# Patient Record
Sex: FEMALE | Race: WHITE | NOT HISPANIC OR LATINO | Employment: FULL TIME | URBAN - METROPOLITAN AREA
[De-identification: names, ages, dates, MRNs, and addresses within clinical notes are randomized per-mention and may not be internally consistent; named-entity substitution may affect disease eponyms.]

---

## 2018-04-12 ENCOUNTER — OFFICE VISIT (OUTPATIENT)
Dept: OBSTETRICS AND GYNECOLOGY | Facility: CLINIC | Age: 28
End: 2018-04-12

## 2018-04-12 VITALS
SYSTOLIC BLOOD PRESSURE: 136 MMHG | BODY MASS INDEX: 25.99 KG/M2 | HEIGHT: 65 IN | DIASTOLIC BLOOD PRESSURE: 87 MMHG | HEART RATE: 110 BPM | WEIGHT: 156 LBS

## 2018-04-12 DIAGNOSIS — Z71.6 ENCOUNTER FOR TOBACCO USE CESSATION COUNSELING: ICD-10-CM

## 2018-04-12 DIAGNOSIS — Z11.3 SCREEN FOR STD (SEXUALLY TRANSMITTED DISEASE): Primary | ICD-10-CM

## 2018-04-12 DIAGNOSIS — R87.612 LOW GRADE SQUAMOUS INTRAEPITH LESION ON CYTOLOGIC SMEAR CERVIX (LGSIL): ICD-10-CM

## 2018-04-12 DIAGNOSIS — Z30.09 BIRTH CONTROL COUNSELING: ICD-10-CM

## 2018-04-12 DIAGNOSIS — Z11.4 SCREENING FOR HIV (HUMAN IMMUNODEFICIENCY VIRUS): ICD-10-CM

## 2018-04-12 DIAGNOSIS — Z12.4 SCREENING FOR CERVICAL CANCER: ICD-10-CM

## 2018-04-12 PROCEDURE — 99406 BEHAV CHNG SMOKING 3-10 MIN: CPT | Performed by: OBSTETRICS & GYNECOLOGY

## 2018-04-12 PROCEDURE — 99214 OFFICE O/P EST MOD 30 MIN: CPT | Performed by: OBSTETRICS & GYNECOLOGY

## 2018-04-12 NOTE — PROGRESS NOTES
"Subjective   Baron Herman is a 27 y.o. female.   CC: Pt here for std check.  History of Present Illness   Pt here for std check.  Patient was diagnosed with chlamydia in October 2016.  She has not been seen since then.  She reports that she thought that she was supposed to have a week of therapy; however, she only received 1 day of therapy.  I explained to the patient this is the standard care for chlamydia.  She reports that her partner was also evaluated; however, they had never had sex again after she was diagnosed with chlamydia.  The patient denies any abnormal symptoms today.  She denies discharge, pain, burning.  She denies irregularities to her periods.  She is sexually active.  She is interested in birth control.  She thinks she would like the Mirena.  We have discussed this in the past, and she states that she is ready to move forward with the Mirena.    Social History   Substance Use Topics   • Smoking status: Current Every Day Smoker     Packs/day: 0.50   • Smokeless tobacco: Never Used      Comment: Risks of smoking were discussed.  Full cessation is encouraged.   • Alcohol use No       Review of Systems  General: No fever or chills  Abdomen: No nausea, vomiting, constipation or diarrhea  : No dysuria, no hematuria  Neuro: No parathesia, no weakness  Psych: Normal though content, no hallucinations, no SI/HI    Objective   Physical Exam  Vitals:    04/12/18 0817   BP: 136/87   Pulse: 110   Weight: 70.8 kg (156 lb)   Height: 165.1 cm (65\")   Gen: No acute distress, awake and oriented times three  Abdomen: soft, nontender, non distended, normoactive bowel sounds  Pelvic: Exam performed in the presence of a female chaperone  Patient has provided verbal consent to proceed with exam.  Normal external female genitalia, no lesions  Vagina: No blood or discharge  Cervix: No cervical motion tenderness, no lesions, no active bleeding, nonfriable  Uterus: Anteverted, normal size and shape, nontender  Adnexa: " No masses or tenderness  Rectal: Deferred  Psych: Good judgement and insight, normal affect and mood      Assessment/Plan   Diagnoses and all orders for this visit:    Screen for STD (sexually transmitted disease)  -     Chlamydia trachomatis, Neisseria gonorrhoeae, Trichomonas vaginalis, PCR - Swab, Vagina  -     HIV-1 / O / 2 Ag / Antibody 4th Generation  -     Hepatitis C Antibody  -     Hepatitis B Surface Antigen  -     RPR    Low grade squamous intraepith lesion on cytologic smear cervix (lgsil)  -     IGP, Apt HPV,rfx 16 / 18,45 - ThinPrep Vial, Cervix    Screening for cervical cancer  -     IGP, Apt HPV,rfx 16 / 18,45 - ThinPrep Vial, Cervix    Screening for HIV (human immunodeficiency virus)  -     HIV-1 / O / 2 Ag / Antibody 4th Generation    Birth control counseling    STD testing performed. She is instructed to call our office for the results if she has not heard them after 1 week.   Additionally, the patient had an abnormal Pap smear at the time of her last visit.  She was previously counseled about these Pap smear findings and the need for repeat Pap smear in one year; however, she was lost to follow-up.  She again had questions about her abnormal Pap smear findings.  These findings were discussed at length again today.  I explained recommendations to repeat Pap smear at this time.  I expanded for Pap smear is abnormal again she will need to undergo colposcopic evaluation.  Vision is a smoker.  We discussed the importance of tobacco cessation in general, and especially as it applies to cervical cancer pathology.  Greater than 3 minutes was spent on tobacco cessation counseling.  We discussed birth control options.  The patient is interested in Mirena.  Educational handouts were provided to the patient.  We will attempt order the device so that it may be inserted at the time of her next menses.  Preoperative instructions for Mirena insertion were given to the patient.    I spent 20 out of 25 minutes  with the patient in face to face counseling of the above issues.

## 2018-04-13 ENCOUNTER — TELEPHONE (OUTPATIENT)
Dept: OBSTETRICS AND GYNECOLOGY | Facility: CLINIC | Age: 28
End: 2018-04-13

## 2018-04-13 LAB
HBV SURFACE AG SERPL QL IA: NEGATIVE
HCV AB S/CO SERPL IA: <0.1 S/CO RATIO (ref 0–0.9)
HIV 1+2 AB+HIV1 P24 AG SERPL QL IA: NON REACTIVE
RPR SER QL: NORMAL

## 2018-04-13 NOTE — TELEPHONE ENCOUNTER
----- Message from Amrik Berumen MD sent at 4/13/2018 12:38 PM EDT -----  Notify the patient of the STD blood work was normal.  I'm still waiting for the culture and Pap results

## 2018-04-14 LAB
C TRACH RRNA SPEC QL NAA+PROBE: NEGATIVE
N GONORRHOEA RRNA SPEC QL NAA+PROBE: NEGATIVE
T VAGINALIS RRNA SPEC QL NAA+PROBE: NEGATIVE

## 2018-04-16 LAB
CYTOLOGIST CVX/VAG CYTO: NORMAL
CYTOLOGY CVX/VAG DOC THIN PREP: NORMAL
DX ICD CODE: NORMAL
DX ICD CODE: NORMAL
HIV 1 & 2 AB SER-IMP: NORMAL
HPV I/H RISK 4 DNA CVX QL PROBE+SIG AMP: NEGATIVE
OTHER STN SPEC: NORMAL
PATH REPORT.FINAL DX SPEC: NORMAL
PATHOLOGIST CVX/VAG CYTO: NORMAL
STAT OF ADQ CVX/VAG CYTO-IMP: NORMAL

## 2018-04-18 ENCOUNTER — TELEPHONE (OUTPATIENT)
Dept: OBSTETRICS AND GYNECOLOGY | Facility: CLINIC | Age: 28
End: 2018-04-18

## 2018-04-18 NOTE — TELEPHONE ENCOUNTER
----- Message from Amrik Berumen MD sent at 4/17/2018  2:54 PM EDT -----  Notify the patient that her Pap smear was normal and her gonorrhea and chlamydia tests were negative

## 2018-04-23 ENCOUNTER — TELEPHONE (OUTPATIENT)
Dept: OBSTETRICS AND GYNECOLOGY | Facility: CLINIC | Age: 28
End: 2018-04-23

## 2018-04-23 NOTE — TELEPHONE ENCOUNTER
Left message to call office.  Pt's Lay is at office need to schedule an appointment for insertion. NIKKI

## 2018-07-16 ENCOUNTER — TELEPHONE (OUTPATIENT)
Dept: OBSTETRICS AND GYNECOLOGY | Facility: CLINIC | Age: 28
End: 2018-07-16

## 2018-07-24 ENCOUNTER — INITIAL PRENATAL (OUTPATIENT)
Dept: OBSTETRICS AND GYNECOLOGY | Facility: CLINIC | Age: 28
End: 2018-07-24

## 2018-07-24 VITALS — WEIGHT: 156 LBS | SYSTOLIC BLOOD PRESSURE: 136 MMHG | DIASTOLIC BLOOD PRESSURE: 81 MMHG | BODY MASS INDEX: 25.96 KG/M2

## 2018-07-24 DIAGNOSIS — Z02.83 ENCOUNTER FOR DRUG SCREENING: ICD-10-CM

## 2018-07-24 DIAGNOSIS — Z34.01 ENCOUNTER FOR SUPERVISION OF NORMAL FIRST PREGNANCY IN FIRST TRIMESTER: Primary | ICD-10-CM

## 2018-07-24 DIAGNOSIS — N96 HISTORY OF RECURRENT MISCARRIAGES: ICD-10-CM

## 2018-07-24 DIAGNOSIS — Z71.6 ENCOUNTER FOR TOBACCO USE CESSATION COUNSELING: ICD-10-CM

## 2018-07-24 DIAGNOSIS — Z11.4 SCREENING FOR HIV (HUMAN IMMUNODEFICIENCY VIRUS): ICD-10-CM

## 2018-07-24 DIAGNOSIS — Z11.3 SCREEN FOR STD (SEXUALLY TRANSMITTED DISEASE): ICD-10-CM

## 2018-07-24 DIAGNOSIS — Z13.228 CYSTIC FIBROSIS SCREENING: ICD-10-CM

## 2018-07-24 DIAGNOSIS — O36.80X0 PREGNANCY WITH UNCERTAIN FETAL VIABILITY, SINGLE OR UNSPECIFIED FETUS: ICD-10-CM

## 2018-07-24 PROBLEM — L20.81 ATOPIC NEURODERMATITIS: Status: ACTIVE | Noted: 2018-07-24

## 2018-07-24 PROBLEM — Z30.09 BIRTH CONTROL COUNSELING: Status: RESOLVED | Noted: 2018-04-12 | Resolved: 2018-07-24

## 2018-07-24 PROCEDURE — 99214 OFFICE O/P EST MOD 30 MIN: CPT | Performed by: OBSTETRICS & GYNECOLOGY

## 2018-07-24 PROCEDURE — 99406 BEHAV CHNG SMOKING 3-10 MIN: CPT | Performed by: OBSTETRICS & GYNECOLOGY

## 2018-07-24 RX ORDER — PRENATAL VIT/IRON FUM/FOLIC AC 27MG-0.8MG
TABLET ORAL DAILY
COMMUNITY
End: 2018-12-20

## 2018-07-24 RX ORDER — PHENOL 1.4 %
AEROSOL, SPRAY (ML) MUCOUS MEMBRANE
COMMUNITY
End: 2018-07-24

## 2018-07-24 RX ORDER — DIPHENHYDRAMINE HCL 50 MG
50 CAPSULE ORAL EVERY 6 HOURS PRN
COMMUNITY
End: 2018-08-20

## 2018-07-24 NOTE — PATIENT INSTRUCTIONS
Steps to Quit Smoking  Smoking tobacco can be bad for your health. It can also affect almost every organ in your body. Smoking puts you and people around you at risk for many serious long-lasting (chronic) diseases. Quitting smoking is hard, but it is one of the best things that you can do for your health. It is never too late to quit.  What are the benefits of quitting smoking?  When you quit smoking, you lower your risk for getting serious diseases and conditions. They can include:  · Lung cancer or lung disease.  · Heart disease.  · Stroke.  · Heart attack.  · Not being able to have children (infertility).  · Weak bones (osteoporosis) and broken bones (fractures).    If you have coughing, wheezing, and shortness of breath, those symptoms may get better when you quit. You may also get sick less often. If you are pregnant, quitting smoking can help to lower your chances of having a baby of low birth weight.  What can I do to help me quit smoking?  Talk with your doctor about what can help you quit smoking. Some things you can do (strategies) include:  · Quitting smoking totally, instead of slowly cutting back how much you smoke over a period of time.  · Going to in-person counseling. You are more likely to quit if you go to many counseling sessions.  · Using resources and support systems, such as:  ? Online chats with a counselor.  ? Phone quitlines.  ? Printed self-help materials.  ? Support groups or group counseling.  ? Text messaging programs.  ? Mobile phone apps or applications.  · Taking medicines. Some of these medicines may have nicotine in them. If you are pregnant or breastfeeding, do not take any medicines to quit smoking unless your doctor says it is okay. Talk with your doctor about counseling or other things that can help you.    Talk with your doctor about using more than one strategy at the same time, such as taking medicines while you are also going to in-person counseling. This can help make  quitting easier.  What things can I do to make it easier to quit?  Quitting smoking might feel very hard at first, but there is a lot that you can do to make it easier. Take these steps:  · Talk to your family and friends. Ask them to support and encourage you.  · Call phone quitlines, reach out to support groups, or work with a counselor.  · Ask people who smoke to not smoke around you.  · Avoid places that make you want (trigger) to smoke, such as:  ? Bars.  ? Parties.  ? Smoke-break areas at work.  · Spend time with people who do not smoke.  · Lower the stress in your life. Stress can make you want to smoke. Try these things to help your stress:  ? Getting regular exercise.  ? Deep-breathing exercises.  ? Yoga.  ? Meditating.  ? Doing a body scan. To do this, close your eyes, focus on one area of your body at a time from head to toe, and notice which parts of your body are tense. Try to relax the muscles in those areas.  · Download or buy apps on your mobile phone or tablet that can help you stick to your quit plan. There are many free apps, such as QuitGuide from the CDC (Centers for Disease Control and Prevention). You can find more support from smokefree.gov and other websites.    This information is not intended to replace advice given to you by your health care provider. Make sure you discuss any questions you have with your health care provider.  Document Released: 10/14/2010 Document Revised: 08/15/2017 Document Reviewed: 05/03/2016  [employer] Interactive Patient Education © 2018 [employer]

## 2018-07-24 NOTE — PROGRESS NOTES
Chief complaint: Prenatal visit, extensive rash, mild nausea  History of present illness: Patient is here for her initial prenatal visit.  She did go to the emergency room about 1 week ago because of having a sudden sharp pelvic pain after trying to lift someone at work.  She went to [facility] emergency room.  Ultrasound findings there showed a single intrauterine pregnancy measuring 5 weeks and 3 days.  There was a small fetal pole measuring 1.4 mm, but no cardiac activity detected.  Her hCG level was about 3300.  Gonorrhea and chlamydia cultures were negative.  Patient reports that she has felt better since then.  She did recently contracted an extensive pruritic rash.  It is consistent with poison ivy, and her boyfriend also has the same rash, and reports that he was exposed the poison ivy just prior to this starting.  She has tried oral Benadryl and topical hydrocortisone cream with little relief.  She has had no breakthrough bleeding.  She has had some mild nausea.  She's been able to keep this at bay with dietary changes.  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  First miscarriage was at about 5 months in [place].  She states that the baby had passed away, and then she went into labor.  She is uncertain which doctor she was seen then.  She never returned to the doctor after that occurring and did not have any follow-up.  She reports that since that time she has had 2 other early first trimester miscarriages, for which she has never seen a doctor.    Obstetric History       T0      L0     SAB2   TAB0   Ectopic0   Molar0   Multiple0   Live Births0       # Outcome Date GA Lbr [place]/2nd Weight Sex Delivery Anes PTL Lv   4 Current            3 2017 8w0d          2 SAB  4w0d          1   20w0d       FD        History reviewed. No pertinent surgical history.     Past Medical History:   Diagnosis Date   • Anxiety    • Chlamydia      Social History   Substance Use Topics    • Smoking status: Current Every Day Smoker     Packs/day: 0.50   • Smokeless tobacco: Never Used      Comment: Risks of smoking were discussed.  Full cessation is encouraged.   • Alcohol use No     Family History   Problem Relation Age of Onset   • Heart disease Father    • Heart disease Paternal Grandmother    • Heart disease Paternal Grandfather      Meds:  PNV  Melatonin  Benadryl    No Known Allergies  ROS:  General: No fever or chills  Constitutional: No weight loss or gain, no hair loss  HENT: No headache, no hearing loss, no tinnitus  Eyes: normal vision, no eye pain  Lungs: No cough, no shortness of breath  Heart: No chest pain, no palpitations  Abdomen: Pos nausea, No vomiting, constipation or diarrhea  : No dysuria, no hematuria  Skin: Pos rashes  Lymph: No swelling  Neuro: No parathesia, no weakness  Psych: Normal though content, no hallucinations, no SI/HI    PE:  Vitals:    18 1314   BP: 136/81   Weight: 70.8 kg (156 lb)   See initial physical exam in initial flowsheet    Assessment:  1.  27-year-old  4 para 0120 at 7 and one sevenths weeks gestational age by last menstrual period  2.  History of recurrent miscarriage  3.  Mild nausea of pregnancy  4.  Extensive atopic dermatitis  5.  Tobacco use in pregnancy  Plan:  1.  Initial prenatal counseling performed the patient.  We will obtain routine prenatal labs today.  I would also had thyroid screening and diabetes screening given her history of recurrent miscarriage.  We discussed controversies regarding for more extensive workup of recurrent miscarriages such as for acquired or congenital trouble hemophilia.  Explained that given recent ACOG practice bulletin there is no definitively between these conditions and recurrent miscarriage, nor is there any evidence of improvement with treatment of these conditions, thus there is no recommendation to screen for these just for the setting of adverse pregnancy outcomes or recurrent  miscarriages.  We did discuss initiation of vaginal progesterone supplementation.  We will start vaginal progesterone until about 9-10 weeks.  2.  We will need to get the records from her first delivery to get a better idea of whether that was a  labor/, and cervix issue, spontaneous , or fetal demise.  We will attempt to get the [facility].  3.  Ultrasound next available for viability.  Return to office in 1 week to see me.  4.  We discussed the use of medications in pregnancy.  I would recommend that she discontinue melatonin.  She may continue Benadryl as needed for her rash.  I would not recommend systemic steroids at this gestational age.  It will be reasonable for the patient at 1-2 weeks off of work so that she is not exposing other patients to the rash.  5.  Risk of tobacco use were discussed with the patient.  Full cessation is encouraged.  I advised Baron of the risks of continuing to use tobacco, and I provided her with tobacco cessation educational materials in the After Visit Summary.   During this visit, I spent Greater than 3 minutes counseling the patient regarding tobacco cessation.  6.  Dietary lifestyle modifications to help with nausea and vomiting were discussed.

## 2018-07-25 ENCOUNTER — PROCEDURE VISIT (OUTPATIENT)
Dept: OBSTETRICS AND GYNECOLOGY | Facility: CLINIC | Age: 28
End: 2018-07-25

## 2018-07-25 DIAGNOSIS — Z36.89 ENCOUNTER TO ESTABLISH GESTATIONAL AGE USING ULTRASOUND: Primary | ICD-10-CM

## 2018-07-25 LAB
ABO GROUP BLD: (no result)
BASOPHILS # BLD AUTO: 0.1 X10E3/UL (ref 0–0.2)
BASOPHILS NFR BLD AUTO: 1 %
BLD GP AB SCN SERPL QL: NEGATIVE
EOSINOPHIL # BLD AUTO: 0.7 X10E3/UL (ref 0–0.4)
EOSINOPHIL NFR BLD AUTO: 6 %
ERYTHROCYTE [DISTWIDTH] IN BLOOD BY AUTOMATED COUNT: 13.1 % (ref 12.3–15.4)
HBA1C MFR BLD: 5.2 % (ref 4.8–5.6)
HBV SURFACE AG SERPL QL IA: NEGATIVE
HCG INTACT+B SERPL-ACNC: NORMAL MIU/ML
HCT VFR BLD AUTO: 44 % (ref 34–46.6)
HCV AB S/CO SERPL IA: <0.1 S/CO RATIO (ref 0–0.9)
HGB BLD-MCNC: 15.1 G/DL (ref 11.1–15.9)
HIV 1+2 AB+HIV1 P24 AG SERPL QL IA: NON REACTIVE
IMM GRANULOCYTES # BLD: 0 X10E3/UL (ref 0–0.1)
IMM GRANULOCYTES NFR BLD: 0 %
LYMPHOCYTES # BLD AUTO: 2.5 X10E3/UL (ref 0.7–3.1)
LYMPHOCYTES NFR BLD AUTO: 23 %
MCH RBC QN AUTO: 30.9 PG (ref 26.6–33)
MCHC RBC AUTO-ENTMCNC: 34.3 G/DL (ref 31.5–35.7)
MCV RBC AUTO: 90 FL (ref 79–97)
MONOCYTES # BLD AUTO: 0.8 X10E3/UL (ref 0.1–0.9)
MONOCYTES NFR BLD AUTO: 8 %
NEUTROPHILS # BLD AUTO: 6.8 X10E3/UL (ref 1.4–7)
NEUTROPHILS NFR BLD AUTO: 62 %
PLATELET # BLD AUTO: 398 X10E3/UL (ref 150–379)
RBC # BLD AUTO: 4.89 X10E6/UL (ref 3.77–5.28)
RH BLD: POSITIVE
RPR SER QL: NON REACTIVE
RUBV IGG SERPL IA-ACNC: 1.29 INDEX
TSH SERPL DL<=0.005 MIU/L-ACNC: 1.46 UIU/ML (ref 0.45–4.5)
WBC # BLD AUTO: 10.9 X10E3/UL (ref 3.4–10.8)

## 2018-07-25 PROCEDURE — 76817 TRANSVAGINAL US OBSTETRIC: CPT | Performed by: OBSTETRICS & GYNECOLOGY

## 2018-07-26 ENCOUNTER — TELEPHONE (OUTPATIENT)
Dept: OBSTETRICS AND GYNECOLOGY | Facility: CLINIC | Age: 28
End: 2018-07-26

## 2018-07-26 LAB
BACTERIA UR CULT: NORMAL
BACTERIA UR CULT: NORMAL

## 2018-07-26 NOTE — TELEPHONE ENCOUNTER
----- Message from Amrik Berumen MD sent at 7/25/2018  1:02 PM EDT -----  Let the patient know that her hCG level is rising appropriately.  The rest of her blood work was basically normal.

## 2018-07-31 ENCOUNTER — PROCEDURE VISIT (OUTPATIENT)
Dept: OBSTETRICS AND GYNECOLOGY | Facility: CLINIC | Age: 28
End: 2018-07-31

## 2018-07-31 ENCOUNTER — ROUTINE PRENATAL (OUTPATIENT)
Dept: OBSTETRICS AND GYNECOLOGY | Facility: CLINIC | Age: 28
End: 2018-07-31

## 2018-07-31 VITALS — DIASTOLIC BLOOD PRESSURE: 84 MMHG | WEIGHT: 162 LBS | SYSTOLIC BLOOD PRESSURE: 138 MMHG | BODY MASS INDEX: 26.96 KG/M2

## 2018-07-31 DIAGNOSIS — O36.80X0 PREGNANCY WITH UNCERTAIN FETAL VIABILITY, SINGLE OR UNSPECIFIED FETUS: Primary | ICD-10-CM

## 2018-07-31 DIAGNOSIS — O36.80X0 PREGNANCY WITH UNCERTAIN FETAL VIABILITY, SINGLE OR UNSPECIFIED FETUS: ICD-10-CM

## 2018-07-31 DIAGNOSIS — N96 HISTORY OF RECURRENT MISCARRIAGES: ICD-10-CM

## 2018-07-31 DIAGNOSIS — Z34.01 ENCOUNTER FOR SUPERVISION OF NORMAL FIRST PREGNANCY IN FIRST TRIMESTER: Primary | ICD-10-CM

## 2018-07-31 PROBLEM — Z02.83 ENCOUNTER FOR DRUG SCREENING: Status: RESOLVED | Noted: 2018-07-24 | Resolved: 2018-07-31

## 2018-07-31 PROBLEM — Z12.4 SCREENING FOR CERVICAL CANCER: Status: RESOLVED | Noted: 2018-04-12 | Resolved: 2018-07-31

## 2018-07-31 LAB
CFTR MUT ANL BLD/T: NORMAL
LABORATORY COMMENT REPORT: NORMAL

## 2018-07-31 PROCEDURE — 99213 OFFICE O/P EST LOW 20 MIN: CPT | Performed by: OBSTETRICS & GYNECOLOGY

## 2018-07-31 PROCEDURE — 76817 TRANSVAGINAL US OBSTETRIC: CPT | Performed by: OBSTETRICS & GYNECOLOGY

## 2018-07-31 NOTE — PROGRESS NOTES
Chief complaint: Prenatal follow-up  History of present illness: Patient is here for her a 1 week follow-up visit.  Patient does have a history of miscarriage ×3.  Last week, her ultrasound showed a fetal heart rate of 103.  I recommended repeat ultrasound this week to reevaluate for viability.  She reports that she is feeling well at this time.  No bleeding.  Patient did have significant atopic dermatitis last week, which she says has improved some but the rash persists.  Objective: See vital signs in flowsheet  Gen.: No acute distress, awake and oriented ×3  Abdomen: Soft, nontender, skin: Still persistent atopic rash over most surfaces, but overall improved from last week.  Psychiatric: Good judgment and insight, normal affect and mood  Labs: Prenatal labs reviewed, flowsheet updated  Ultrasound today: Live single intrauterine pregnancy measuring 6 weeks and 3 days.  There is been an interval increase in size of the crown-rump length from the ultrasound 6 days ago.  Fetal heart rate has increased from 103-125.  Assessment:  1.  27-year-old  4 para 0120 at 6-6/7 weeks gestational age  2.  History of recurrent miscarriage  3.  Atopic dermatitis  Plan:  1.  Lab results were discussed with the patient.  Ultrasound results were discussed.  There has been overall increase in the size of the crown-rump length and increase in the fetal heart rate.  This is overall reassuring.  I would like to look one more time in about 2 weeks with another ultrasound to evaluate for viability again.  Patient does have a history of recurrent miscarriages.  I recommend that she continue progesterone supplementation.  I spent 13 out of 15 minutes with the patient in face to face counseling of the above issues.

## 2018-08-03 LAB
ANNOTATION COMMENT IMP: NORMAL
ANNOTATION COMMENT IMP: NORMAL
CARRIER DETECTION RATE:: NORMAL
ETHNIC BACKGROUND STATED: NORMAL
GEN KNWLDGE REF ID: NORMAL
GENETIC COUNSELOR:: NORMAL
PATHOLOGIST NAME: NORMAL
REASON FOR REFERRAL (NARRATIVE): NORMAL
REF LAB TEST METHOD: NORMAL
SMN1 GENE MUT ANL BLD/T: NORMAL
SMN1 GENE MUT ANL BLD/T: NORMAL
SPEC CONTAINER SPEC: NORMAL
SPECIMEN SOURCE: NORMAL
TEST PERFORMANCE INFO SPEC: NORMAL

## 2018-08-07 ENCOUNTER — TELEPHONE (OUTPATIENT)
Dept: OBSTETRICS AND GYNECOLOGY | Facility: CLINIC | Age: 28
End: 2018-08-07

## 2018-08-07 LAB
11OH-THC SPEC-MCNC: NEGATIVE NG/ML
AMPHETAMINES SERPL QL SCN: NEGATIVE NG/ML
BARBITURATES SERPL QL SCN: NEGATIVE UG/ML
BENZODIAZ SERPL QL SCN: NEGATIVE NG/ML
CANNABIDIOL SERPLBLD CFM-MCNC: NEGATIVE NG/ML
CANNABINOIDS SERPL QL SCN: ABNORMAL NG/ML
CANNABINOIDS SPEC QL CFM: POSITIVE
CANNABINOL: NEGATIVE NG/ML
CARBOXYTHC SPEC-MCNC: 18.7 NG/ML
COCAINE+BZE SERPL QL SCN: NEGATIVE NG/ML
METHADONE SERPL QL SCN: NEGATIVE NG/ML
OPIATES SERPL QL SCN: NEGATIVE NG/ML
OXYCODONE+OXYMORPHONE SERPLBLD QL SCN: NEGATIVE NG/ML
PCP SERPL QL SCN: NEGATIVE NG/ML
PROPOXYPH SERPL QL SCN: NEGATIVE NG/ML
THC SERPLBLD CFM-MCNC: 1.5 NG/ML

## 2018-08-07 NOTE — TELEPHONE ENCOUNTER
Pt states that she just started bleeding lightly like she is about to start her period. She stated that there are no clots or cramping just light bleeding. Please advise.     Call back # [phone]

## 2018-08-07 NOTE — TELEPHONE ENCOUNTER
The patient can monitor this.  If the bleeding is light, I think it is okay.  If she begins to have bleeding like a period or heavier, or if she starts to have severe pain, I would recommend that she go to the hospital at [facility] to the emergency room to be seen.

## 2018-08-14 ENCOUNTER — OFFICE VISIT (OUTPATIENT)
Dept: OBSTETRICS AND GYNECOLOGY | Facility: CLINIC | Age: 28
End: 2018-08-14

## 2018-08-14 ENCOUNTER — PROCEDURE VISIT (OUTPATIENT)
Dept: OBSTETRICS AND GYNECOLOGY | Facility: CLINIC | Age: 28
End: 2018-08-14

## 2018-08-14 VITALS
DIASTOLIC BLOOD PRESSURE: 88 MMHG | HEART RATE: 109 BPM | HEIGHT: 65 IN | SYSTOLIC BLOOD PRESSURE: 138 MMHG | WEIGHT: 159 LBS | BODY MASS INDEX: 26.49 KG/M2

## 2018-08-14 DIAGNOSIS — O02.1 MISSED ABORTION: Primary | ICD-10-CM

## 2018-08-14 DIAGNOSIS — N96 HISTORY OF RECURRENT MISCARRIAGES: ICD-10-CM

## 2018-08-14 PROBLEM — L20.81 ATOPIC NEURODERMATITIS: Status: RESOLVED | Noted: 2018-07-24 | Resolved: 2018-08-14

## 2018-08-14 PROBLEM — Z34.01 ENCOUNTER FOR SUPERVISION OF NORMAL FIRST PREGNANCY IN FIRST TRIMESTER: Status: RESOLVED | Noted: 2018-07-24 | Resolved: 2018-08-14

## 2018-08-14 PROBLEM — Z13.228 CYSTIC FIBROSIS SCREENING: Status: RESOLVED | Noted: 2018-07-24 | Resolved: 2018-08-14

## 2018-08-14 PROCEDURE — 99214 OFFICE O/P EST MOD 30 MIN: CPT | Performed by: OBSTETRICS & GYNECOLOGY

## 2018-08-14 PROCEDURE — 76817 TRANSVAGINAL US OBSTETRIC: CPT | Performed by: OBSTETRICS & GYNECOLOGY

## 2018-08-14 NOTE — PROGRESS NOTES
"Subjective   Baron Herman is a 27 y.o. female.   CC: Pt here for f/u missed ab.   History of Present Illness   Pt here for f/u missed ab.  Patient had an ultrasound today to confirm fetal viability.  Her most recent ultrasound that showed fetal heart tones of 123.  Ultrasound today shows no fetal heart tones.  The patient does have a history of roughly 20 week pregnancy loss, as well as 2 first trimester pregnancy loss prior to today.  She has been doing progesterone supplementation.  She had some light bleeding about a week ago that resolved spontaneously.  Today she is not having any bleeding.  She denies pelvic or abdominal pain or cramping.      The following portions of the patient's history were reviewed and updated as appropriate: allergies, current medications, past family history, past medical history, past social history, past surgical history and problem list.     Review of Systems  General: No fever or chills  Constitutional: No weight loss or gain, no hair loss  HENT: No headache, no hearing loss, no tinnitus  Eyes: normal vision, no eye pain  Lungs: No cough, no shortness of breath  Heart: No chest pain, no palpitations  Abdomen: No nausea, vomiting, constipation or diarrhea  : No dysuria, no hematuria  Skin: No rashes  Lymph: No swelling  Neuro: No parathesia, no weakness  Psych: Normal though content, no hallucinations, no SI/HI    Objective   Physical Exam  Vitals:    08/14/18 1137   BP: 138/88   Pulse: 109   Weight: 72.1 kg (159 lb)   Height: 165.1 cm (65\")   Gen: No acute distress, awake and oriented times three  Abdomen: soft, nontender, non distended, normoactive bowel sounds  Pelvic:  Deferred  Psych: Good judgement and insight, normal affect and mood    Ultrasound today: Single intrauterine pregnancy measuring only 6 weeks and 4 days.  Fetal heart tones are no longer detected.  Previous ultrasound on 7/31 showed a single pregnancy measuring 6 weeks and 3 days.  There has been basically no " interval growth of the pregnancy since then.  Findings consistent with missed .    Assessment/Plan   Diagnoses and all orders for this visit:    Missed     History of recurrent miscarriages      The ultrasound findings have been discussed with the patient and family member at length.  I've explained that unfortunately the findings today are consistent with a missed .  We discussed potential causes at length.  I explained to the patient that ultimately many times no identifying causes can be noted.  We have discussed evaluation of recurrent first trimester miscarriage, possibly to include maternal karyotype, thyroid screening, diabetes screening, screening for antiphospholipid antibody syndrome, and potentially for screening of other thrombophilia's.  I discussed that screening for thrombophilia's are controversial, as ACOG notes that there is no definitively between thrombophilia other than antiphospholipid antibody syndrome and adverse pregnancy outcomes including recurrent miscarriage.  Furthermore, except for antiphospholipid antibody syndrome, there is no evidence that prophylactic treatment in the setting of inherited thrombophilia's improved pregnancy outcomes.  Despite this, it may still be reasonable to pursue testing for these modalities is trying to understand why the patient has continued recurrent miscarriages.  I believe that in future pregnancies continued trials of vaginal progesterone may be warranted as well as potentially low-dose aspirin.  We discussed these findings with the patient.  We've also discussed management options at this time.  We discussed the options of expectant, medical, and surgical management.  I explained that at this time that there is no proven benefit of one option more so than the other 2.  The patient would prefer to wait until next week to have repeat ultrasound to confirm today's findings.  I believe this is a reasonable approach.  In the meantime  we have discussed bleeding precautions, etc.  All questions answered today.  Return to the office next week for repeat ultrasound in further planning therein.    I spent 20 out of 25 minutes with the patient in face to face counseling of the above issues.

## 2018-08-20 ENCOUNTER — OFFICE VISIT (OUTPATIENT)
Dept: OBSTETRICS AND GYNECOLOGY | Facility: CLINIC | Age: 28
End: 2018-08-20

## 2018-08-20 ENCOUNTER — PROCEDURE VISIT (OUTPATIENT)
Dept: OBSTETRICS AND GYNECOLOGY | Facility: CLINIC | Age: 28
End: 2018-08-20

## 2018-08-20 VITALS
HEIGHT: 65 IN | WEIGHT: 160 LBS | HEART RATE: 101 BPM | BODY MASS INDEX: 26.66 KG/M2 | DIASTOLIC BLOOD PRESSURE: 80 MMHG | SYSTOLIC BLOOD PRESSURE: 118 MMHG

## 2018-08-20 DIAGNOSIS — O02.1 MISSED ABORTION: Primary | ICD-10-CM

## 2018-08-20 DIAGNOSIS — N96 HISTORY OF RECURRENT MISCARRIAGES: ICD-10-CM

## 2018-08-20 DIAGNOSIS — O02.1 MISSED AB: Primary | ICD-10-CM

## 2018-08-20 PROBLEM — O36.80X0 PREGNANCY WITH UNCERTAIN FETAL VIABILITY: Status: RESOLVED | Noted: 2018-07-31 | Resolved: 2018-08-20

## 2018-08-20 PROCEDURE — 76830 TRANSVAGINAL US NON-OB: CPT | Performed by: OBSTETRICS & GYNECOLOGY

## 2018-08-20 PROCEDURE — 99213 OFFICE O/P EST LOW 20 MIN: CPT | Performed by: OBSTETRICS & GYNECOLOGY

## 2018-08-20 RX ORDER — MISOPROSTOL 200 UG/1
TABLET ORAL
Qty: 4 TABLET | Refills: 1 | Status: SHIPPED | OUTPATIENT
Start: 2018-08-20 | End: 2018-12-20

## 2018-08-20 RX ORDER — HYDROCODONE BITARTRATE AND ACETAMINOPHEN 5; 325 MG/1; MG/1
1-2 TABLET ORAL EVERY 6 HOURS PRN
Qty: 12 TABLET | Refills: 0 | Status: SHIPPED | OUTPATIENT
Start: 2018-08-20 | End: 2018-08-22

## 2018-08-20 RX ORDER — IBUPROFEN 800 MG/1
800 TABLET ORAL EVERY 8 HOURS PRN
Qty: 12 TABLET | Refills: 0 | Status: SHIPPED | OUTPATIENT
Start: 2018-08-20 | End: 2018-08-24

## 2018-08-20 NOTE — PROGRESS NOTES
"Subjective   Baron Herman is a 27 y.o. female.   CC: Pt here for f/u missed Ab  History of Present Illness   Pt here for f/u missed Ab.  Last week, patient was diagnosed with a missed .  She wanted to have another ultrasound this week to confirm the findings.  She reports that she's had her usual state of health at this time.  She denies vaginal bleeding or pelvic and abdominal pain.  Patient has had multiple miscarriages in the past.    Ultrasound today: Single intrauterine pregnancy with no fetal cardiac activity detected.  Crown-rump length is 0.58 cm, which has decreased about 0.1 cm from last week.  Findings are diagnostic of a missed .    The following portions of the patient's history were reviewed and updated as appropriate: allergies, current medications, past family history, past medical history, past social history, past surgical history and problem list.    Review of Systems    Objective   Physical Exam  Vitals:    18 1420   BP: 118/80   Pulse: 101   Weight: 72.6 kg (160 lb)   Height: 165.1 cm (65\")   Gen: No acute distress, awake and oriented times three  Abdomen: soft, nontender, non distended, normoactive bowel sounds  Psych: Good judgement and insight, normal affect and mood      Assessment/Plan   Diagnoses and all orders for this visit:    Missed   -     misoprostol (CYTOTEC) 200 MCG tablet; Insert 4 tablets into the vagina once.  May repeat dose in 24 hours if ineffective.  Repeat dose may be taken orally instead of vaginally.  -     HYDROcodone-acetaminophen (NORCO) 5-325 MG per tablet; Take 1-2 tablets by mouth Every 6 (Six) Hours As Needed for Moderate Pain  or Severe Pain  for up to 2 days.  -     ibuprofen (ADVIL,MOTRIN) 800 MG tablet; Take 1 tablet by mouth Every 8 (Eight) Hours As Needed for Mild Pain  or Moderate Pain  for up to 4 days.  Last week we discussed the patient's options of expectant versus medical versus surgical management.  She has consider " these options and is electing medical management.  Instructions for use of Cytotec at home were discussed with the patient.  Bleeding precautions were discussed.  I'll also give the patient prescription for hydrocodone as needed for pain associated with a miscarriage.  Risks of addiction, abuse, etc. were discussed.  A 2 day supply is prescribed.  Patient should return in 1 week for follow-up ultrasound and follow-up of miscarriage.  History of recurrent miscarriages  Patient does have a history of recurrent miscarriages.  I would recommend screening to include TSH, hemoglobin A1c, antiphospholipid antibody panel, as well as inherited thrombophilia workup, maternal karyotype.  This is a different father this pregnancy than her previous miscarriages, so I do not feel that a paternal karyotype is recommended.  Patient would like to have these drawn at the next visit instead of today.

## 2018-08-21 ENCOUNTER — TELEPHONE (OUTPATIENT)
Dept: OBSTETRICS AND GYNECOLOGY | Facility: CLINIC | Age: 28
End: 2018-08-21

## 2018-08-21 NOTE — TELEPHONE ENCOUNTER
Pt called, stating that she took her Cytotec yesterday when she got home from her appointment. Pt states that she has been bleeding but she has not had any clots. Pt would like to know if she needs to take the other rx to the pharmacy and repeat it again. Please advise.       Pt callback: [phone]

## 2018-08-21 NOTE — TELEPHONE ENCOUNTER
If she has not had the passage of any clots or tissue, it would probably be a good idea to take the second dose of medication.  She should take this dose orally.

## 2018-08-28 ENCOUNTER — TELEPHONE (OUTPATIENT)
Dept: OBSTETRICS AND GYNECOLOGY | Facility: CLINIC | Age: 28
End: 2018-08-28

## 2018-08-28 ENCOUNTER — DOCUMENTATION (OUTPATIENT)
Dept: OBSTETRICS AND GYNECOLOGY | Facility: CLINIC | Age: 28
End: 2018-08-28

## 2018-08-28 NOTE — PROGRESS NOTES
Patient did not show up for her appointment today for ultrasound and follow-up of missed .  She had elected for medical therapy last week.  She took her initial dose of Cytotec last week, but states that she had only bleeding with no passage of tissue.  At the time, we advised her to complete a repeat dose the next day.  I called the patient today.  She states that she had to take her children to her doctor's appointment, so she needs to reschedule her appointment.  She states that she did subsequent had the passage of some tissue at the second dose of medication.  She reports that she had bled until yesterday, and her bleeding stopped today.  I recommended the patient make an appointment to see me for follow-up and ultrasound.  She states that she will schedule another appointment.

## 2018-12-20 ENCOUNTER — OFFICE VISIT (OUTPATIENT)
Dept: OBSTETRICS AND GYNECOLOGY | Facility: CLINIC | Age: 28
End: 2018-12-20

## 2018-12-20 VITALS
DIASTOLIC BLOOD PRESSURE: 77 MMHG | HEIGHT: 65 IN | HEART RATE: 80 BPM | WEIGHT: 173 LBS | SYSTOLIC BLOOD PRESSURE: 116 MMHG | BODY MASS INDEX: 28.82 KG/M2

## 2018-12-20 DIAGNOSIS — Z13.71 ENCNTR FOR NONPROCREAT SCREEN FOR GENETIC DIS CARRIER STATUS: ICD-10-CM

## 2018-12-20 DIAGNOSIS — N96 HISTORY OF RECURRENT MISCARRIAGES: Primary | ICD-10-CM

## 2018-12-20 DIAGNOSIS — Z30.09 FAMILY PLANNING ADVICE: ICD-10-CM

## 2018-12-20 LAB
HBA1C MFR BLD: 5.4 % (ref 4.8–5.6)
HCG INTACT+B SERPL-ACNC: <0.5 MIU/ML

## 2018-12-20 PROCEDURE — 99213 OFFICE O/P EST LOW 20 MIN: CPT | Performed by: OBSTETRICS & GYNECOLOGY

## 2018-12-20 RX ORDER — CHOLECALCIFEROL (VITAMIN D3) 125 MCG
5 CAPSULE ORAL
COMMUNITY

## 2018-12-20 RX ORDER — ASCORBIC ACID, CALCIUM CITRATE, IRON, VITAMIN D, DL- ALPHA- TOCOPHEROL ACETATE, THIAMINE, RIBOFLAVIN, NIACINAMIDE, PYRIDOXINE HYDROCHLORIDE, FOLIC ACID, IODINE, ZINC, COPPER, DOCUSATE SODIUM, DOCONEXENT AND ICOSAPENT
1 KIT DAILY
Qty: 60 TABLET | Refills: 11 | Status: SHIPPED | OUTPATIENT
Start: 2018-12-20 | End: 2019-12-20

## 2018-12-20 NOTE — PROGRESS NOTES
Subjective   Baron Herman is a 27 y.o. female.   CC: pt here for f/u of miscarriage.   History of Present Illness   Patient had a missed  in 2018.  She had elected for medical management.  That had been her third consecutive first trimester pregnancy loss.  At the time, I recommended lab follow-up to follow out her hCG levels back to 0.  I had also recommended screening for causes of recurrent pregnancy loss.  The patient did not return for her blood work at the time.  She made an appointment at this time to have the blood work done.  Patient also reports that she is concerned about her ability to conceive.  Despite this fact, patient has had pregnancies in , , 2017, and 2018.  She has a different partner for her most recent pregnancy loss.  Her first 2014 was of unclear outcome.  She had a delivery at around 20 weeks with either a intrauterine fetal demise versus late  miscarriage/ labor.  Patient states that she is having regular periods at this time.  She does have a history of chlamydia infection in the past.  She states that she is not currently taking prenatal vitamins      Current Outpatient Medications:   •  melatonin 5 MG tablet tablet, Take 5 mg by mouth., Disp: , Rfl:     No Known Allergies    The following portions of the patient's history were reviewed and updated as appropriate: allergies, current medications, past family history, past medical history, past social history, past surgical history and problem list.    Review of Systems  General: No fever or chills  Constitutional: No weight loss or gain, no hair loss  HENT: No headache, no hearing loss, no tinnitus  Eyes: normal vision, no eye pain  Lungs: No cough, no shortness of breath  Heart: No chest pain, no palpitations  Abdomen: No nausea, vomiting, constipation or diarrhea  : No dysuria, no hematuria  Skin: No rashes  Lymph: No swelling  Neuro: No parathesia, no weakness  Psych: Normal though content,  "no hallucinations, no SI/HI    Objective   Physical Exam  Vitals:    12/20/18 1034   BP: 116/77   Pulse: 80   Weight: 78.5 kg (173 lb)   Height: 165.1 cm (65\")     Patient's last menstrual period was 11/22/2018 (exact date).     Gen.: No acute distress, awake and oriented ×3  Psychiatric: Good judgment and insight, normal affect and mood  Neurologic: Cranial nerves II through XII intact, no deficits    Assessment/Plan   Diagnoses and all orders for this visit:    History of recurrent miscarriages  -     Hemoglobin A1c  -     Antinuclear Antibody With Reflex Cascade  -     Anticardiolipin Antibody, IgG / M, Qn  -     Lupus Anticoagulant Panel  -     Antithrombin III  -     Factor II, DNA Analysis  -     Factor 5 Leiden  -     Protein C & S Antigens  -     Chromosome Analysis, Peripheral Blood  -     HCG, B-subunit, Quantitative    We'll get a workup above for recurrent pregnancy loss.  I do not feel the paternal karyotype is necessary at this time since she has had different partners.  I've encouraged the patient to start a prenatal vitamin and I will send a prescription to the pharmacy for this.  The importance of taking this prior to conception.  Encntr for nonprocreat screen for genetic dis carrier status  -     Antithrombin III  -     Factor II, DNA Analysis  -     Factor 5 Leiden  -     Protein C & S Antigens  -     Chromosome Analysis, Peripheral Blood    Family planning advice  -     Prenat w/o [id number] (CITRANATAL ASSURE) 35-1 & 300 MG tablet; Take 1 tablet by mouth Daily.    Pt is also concerned about her ability to conceive.  She requested today to have a workup to try to help her conceive.  The patient has had pregnancies each of the last 3 years.  I don't think her problems with getting pregnant.  I do not feel that she needs any further workup for infertility at this time.  I have advised the patient to call me when she has a positive pregnancy test.  Alternatively she may make an appointment " in about 6 months if she has not conceived by then.

## 2018-12-21 LAB
ANA SER QL: POSITIVE
CARDIOLIPIN IGG SER IA-ACNC: <9 GPL U/ML (ref 0–14)
CARDIOLIPIN IGM SER IA-ACNC: <9 MPL U/ML (ref 0–12)
CHROMATIN AB SERPL-ACNC: <0.2 AI (ref 0–0.9)
DSDNA AB SER-ACNC: <1 IU/ML (ref 0–9)
ENA RNP AB SER-ACNC: <0.2 AI (ref 0–0.9)
ENA SM AB SER-ACNC: <0.2 AI (ref 0–0.9)
ENA SM+RNP AB SER-ACNC: <0.2 AI (ref 0–0.9)
ENA SS-A AB SER-ACNC: 2.6 AI (ref 0–0.9)
Lab: ABNORMAL

## 2018-12-22 LAB — AT III ACT/NOR PPP CHRO: 110 % (ref 75–135)

## 2018-12-24 LAB
F2 GENE MUT ANL BLD/T: NORMAL
F5 GENE MUT ANL BLD/T: NORMAL

## 2018-12-26 LAB
APTT HEX PL PPP: 6 SEC
APTT IMM NP PPP: NORMAL SEC
APTT PPP 1:1 SALINE: NORMAL SEC
APTT PPP: 26.7 SEC
B2 GLYCOPROT1 IGA SER-ACNC: <10 SAU
B2 GLYCOPROT1 IGG SER-ACNC: <10 SGU
B2 GLYCOPROT1 IGM SER-ACNC: <10 SMU
CARDIOLIPIN IGG SER IA-ACNC: <10 GPL
CARDIOLIPIN IGM SER IA-ACNC: <10 MPL
CONFIRM DRVVT: NORMAL SEC
DRVVT SCREEN TO CONFIRM RATIO: NORMAL RATIO
INR PPP: 1 RATIO
LA NT PLATELET PPP: 0 SEC
LA PPP-IMP: NORMAL
PROT C AG ACT/NOR PPP IA: 99 % (ref 60–150)
PROT S AG ACT/NOR PPP IA: 88 % (ref 60–150)
PROT S FREE AG ACT/NOR PPP IA: 103 % (ref 57–157)
PROTHROMBIN TIME: 10.6 SEC
SCREEN DRVVT: 36.6 SEC
THROMBIN TIME: 17.8 SEC

## 2018-12-28 ENCOUNTER — TELEPHONE (OUTPATIENT)
Dept: OBSTETRICS AND GYNECOLOGY | Facility: CLINIC | Age: 28
End: 2018-12-28

## 2018-12-28 NOTE — TELEPHONE ENCOUNTER
----- Message from Amrik Berumen MD sent at 12/28/2018 12:23 PM EST -----  Notify the patient that all of her blood work has come back in terms of looking at possible causes of recurrent miscarriages.  She does not have any sort of inheritable or acquired clotting condition; however, some of her blood work indicates that she may have a risk of developing lupus or another autoimmune disorder called Sjogren's (Pronounced show-grin's) syndrome.  The patient will need to see a rheumatologist.  She will need to be referred by her primary care provider, who appears to be Dr. Palacios.  We can also try to either fax or Route these labs to Dr. Palacios's office.

## 2019-01-10 LAB
CELLS ANALYZED: 20
CELLS COUNTED: 20
CELLS KARYOTYPED.TOTAL BLD/T: 2
CLINICAL CYTOGENETICIST SPEC: NORMAL
DIAGNOSTIC IMP SPEC-IMP: NORMAL
ISCN BAND LEVEL QL: 500
KARYOTYP BLD/T: NORMAL
SPECIMEN SOURCE: NORMAL

## 2019-01-11 ENCOUNTER — TELEPHONE (OUTPATIENT)
Dept: OBSTETRICS AND GYNECOLOGY | Facility: CLINIC | Age: 29
End: 2019-01-11

## 2019-01-11 NOTE — TELEPHONE ENCOUNTER
Pt aware. NIKKI      ----- Message from Amrik Berumen MD sent at 1/11/2019 10:01 AM EST -----  Notify the patient that the last part of her blood work has returned.  It was her chromosomal test.  Her chromosomal test is normal

## 2019-07-05 ENCOUNTER — TELEPHONE (OUTPATIENT)
Dept: OBSTETRICS AND GYNECOLOGY | Facility: CLINIC | Age: 29
End: 2019-07-05

## 2022-08-02 ENCOUNTER — HOSPITAL ENCOUNTER (EMERGENCY)
Facility: HOSPITAL | Age: 32
Discharge: HOME | End: 2022-08-02
Attending: EMERGENCY MEDICINE | Admitting: EMERGENCY MEDICINE

## 2022-08-02 VITALS
WEIGHT: 163.36 LBS | OXYGEN SATURATION: 99 % | RESPIRATION RATE: 18 BRPM | HEART RATE: 86 BPM | DIASTOLIC BLOOD PRESSURE: 66 MMHG | TEMPERATURE: 98.4 F | HEIGHT: 65 IN | SYSTOLIC BLOOD PRESSURE: 130 MMHG | BODY MASS INDEX: 27.22 KG/M2

## 2022-08-02 DIAGNOSIS — K08.89 PAIN, DENTAL: Primary | ICD-10-CM

## 2022-08-02 DIAGNOSIS — K04.7 DENTAL ABSCESS: ICD-10-CM

## 2022-08-02 DIAGNOSIS — R22.0 FACIAL SWELLING: ICD-10-CM

## 2022-08-02 PROCEDURE — 96372 THER/PROPH/DIAG INJ SC/IM: CPT

## 2022-08-02 PROCEDURE — 99283 EMERGENCY DEPT VISIT LOW MDM: CPT

## 2022-08-02 PROCEDURE — 25010000002 KETOROLAC TROMETHAMINE PER 15 MG: Performed by: NURSE PRACTITIONER

## 2022-08-02 RX ORDER — KETOROLAC TROMETHAMINE 10 MG/1
10 TABLET, FILM COATED ORAL EVERY 6 HOURS PRN
Qty: 20 TABLET | Refills: 0 | Status: SHIPPED | OUTPATIENT
Start: 2022-08-02

## 2022-08-02 RX ORDER — LIDOCAINE HYDROCHLORIDE 20 MG/ML
10 SOLUTION OROPHARYNGEAL
Status: DISCONTINUED | OUTPATIENT
Start: 2022-08-02 | End: 2022-08-02 | Stop reason: HOSPADM

## 2022-08-02 RX ORDER — KETOROLAC TROMETHAMINE 30 MG/ML
30 INJECTION, SOLUTION INTRAMUSCULAR; INTRAVENOUS ONCE
Status: COMPLETED | OUTPATIENT
Start: 2022-08-02 | End: 2022-08-02

## 2022-08-02 RX ORDER — ACETAMINOPHEN 160 MG/5ML
650 SOLUTION ORAL ONCE
Status: COMPLETED | OUTPATIENT
Start: 2022-08-02 | End: 2022-08-02

## 2022-08-02 RX ORDER — AMOXICILLIN AND CLAVULANATE POTASSIUM 875; 125 MG/1; MG/1
1 TABLET, FILM COATED ORAL EVERY 12 HOURS
Qty: 20 TABLET | Refills: 0 | Status: SHIPPED | OUTPATIENT
Start: 2022-08-02

## 2022-08-02 RX ADMIN — KETOROLAC TROMETHAMINE 30 MG: 30 INJECTION, SOLUTION INTRAMUSCULAR; INTRAVENOUS at 08:04

## 2022-08-02 RX ADMIN — LIDOCAINE HYDROCHLORIDE 10 ML: 20 SOLUTION ORAL; TOPICAL at 08:04

## 2022-08-02 RX ADMIN — ACETAMINOPHEN 650 MG: 160 SOLUTION ORAL at 08:04

## 2022-08-02 RX ADMIN — BENZOCAINE 2 SPRAY: 200 SPRAY DENTAL; ORAL; PERIODONTAL at 08:04

## 2022-08-02 NOTE — ED PROVIDER NOTES
Time: 7:33 AM EDT  Arrived by: private car  Chief Complaint: Dental pain/facial swelling  History provided by: Patient  History is limited by: N/A     History of Present Illness:  Patient is a 31 y.o. year old female who presents to the emergency department with complaint of a dental abscess, pain, and facial swelling since yesterday.  She advises that she has had a bad wisdom tooth on the bottom right for quite a while but has not had any insurance to get it fixed.  She states that her  had a new job and will have insurance in 1 month.  She plans to get it taken care of then.  She states that her swelling started yesterday but was significantly worse this morning.  She does have right-sided low jaw swelling.  She denies fever or any other systemic symptoms.  She has been on antibiotics for this tooth before approximately 2 months ago.          Patient Care Team  Primary Care Provider: ProviderMyrna Known    Past Medical History:     No Known Allergies  Past Medical History:   Diagnosis Date   • Anxiety    • Chlamydia      History reviewed. No pertinent surgical history.  Family History   Problem Relation Age of Onset   • Heart disease Father    • Heart disease Paternal Grandmother    • Heart disease Paternal Grandfather        Home Medications:  Prior to Admission medications    Medication Sig Start Date End Date Taking? Authorizing Provider   ibuprofen (ADVIL,MOTRIN) 800 MG tablet Take 1 tablet by mouth 3 (Three) Times a Day As Needed for Mild Pain  (PAIN). 4/5/22   Gee Aggarwal MD   melatonin 5 MG tablet tablet Take 5 mg by mouth.    Provider, MD Sigifredo        Social History:   Social History     Tobacco Use   • Smoking status: Current Every Day Smoker     Packs/day: 0.50   • Smokeless tobacco: Never Used   • Tobacco comment: Risks of smoking were discussed.  Full cessation is encouraged.   Substance Use Topics   • Alcohol use: No   • Drug use: Yes     Types: Marijuana     Recent travel: no  "    Review of Systems:  Review of Systems   Constitutional: Negative for chills and fever.   HENT: Positive for dental problem (Right lower wisdom tooth) and facial swelling. Negative for congestion, ear pain, rhinorrhea and sore throat.    Eyes: Negative for pain.   Respiratory: Negative for cough and shortness of breath.    Cardiovascular: Negative for chest pain.   Gastrointestinal: Negative for abdominal pain, diarrhea, nausea and vomiting.   Genitourinary: Negative for decreased urine volume, dysuria and flank pain.   Musculoskeletal: Negative for arthralgias and myalgias.   Skin: Negative for rash.   Neurological: Negative for seizures and headaches.   All other systems reviewed and are negative.       Physical Exam:  /66   Pulse 86   Temp 98.4 °F (36.9 °C) (Oral)   Resp 18   Ht 165.1 cm (65\")   Wt 74.1 kg (163 lb 5.8 oz)   LMP 07/24/2022 (Approximate)   SpO2 99%   BMI 27.18 kg/m²     Physical Exam  Vitals and nursing note reviewed.   Constitutional:       General: She is not in acute distress.     Appearance: Normal appearance. She is normal weight. She is not ill-appearing, toxic-appearing or diaphoretic.   HENT:      Head: Normocephalic and atraumatic.      Jaw: Swelling present.        Right Ear: External ear normal.      Left Ear: External ear normal.      Mouth/Throat:      Mouth: Mucous membranes are moist.      Dentition: Abnormal dentition. Dental tenderness, gingival swelling and dental abscesses present.     Eyes:      General: No scleral icterus.     Conjunctiva/sclera: Conjunctivae normal.      Pupils: Pupils are equal, round, and reactive to light.   Cardiovascular:      Rate and Rhythm: Normal rate.   Pulmonary:      Effort: Pulmonary effort is normal. No respiratory distress.   Abdominal:      General: There is no distension.      Tenderness: There is no abdominal tenderness.   Musculoskeletal:         General: No swelling, tenderness, deformity or signs of injury. Normal range of " motion.      Cervical back: Normal range of motion and neck supple.   Skin:     General: Skin is warm and dry.      Capillary Refill: Capillary refill takes less than 2 seconds.   Neurological:      General: No focal deficit present.      Mental Status: She is alert and oriented to person, place, and time.   Psychiatric:         Mood and Affect: Mood normal.         Behavior: Behavior normal.                Medications in the Emergency Department:  Medications   Lidocaine Viscous HCl (XYLOCAINE) 2 % solution 10 mL (has no administration in time range)   acetaminophen (TYLENOL) 160 MG/5ML solution 650 mg (has no administration in time range)   Benzocaine 20 % aerosol 2 spray (has no administration in time range)   ketorolac (TORADOL) injection 30 mg (has no administration in time range)        Labs  Lab Results (last 24 hours)     ** No results found for the last 24 hours. **           Imaging:  No Radiology Exams Resulted Within Past 24 Hours    Procedures:  Procedures    Progress                            Medical Decision Making:  MDM  Number of Diagnoses or Management Options  Dental abscess: new and requires workup  Facial swelling: new and requires workup  Pain, dental: new and requires workup  Risk of Complications, Morbidity, and/or Mortality  Presenting problems: low  Diagnostic procedures: minimal  Management options: low    Patient Progress  Patient progress: stable       Final diagnoses:   Pain, dental   Dental abscess   Facial swelling        Disposition:  ED Disposition     ED Disposition   Discharge    Condition   Stable    Comment   --             This medical record created using voice recognition software.           Vielka Fairchild APRN  08/02/22 0801

## 2023-01-08 ENCOUNTER — APPOINTMENT (OUTPATIENT)
Dept: CT IMAGING | Facility: HOSPITAL | Age: 33
End: 2023-01-08

## 2023-01-08 ENCOUNTER — HOSPITAL ENCOUNTER (EMERGENCY)
Facility: HOSPITAL | Age: 33
Discharge: HOME | End: 2023-01-08
Attending: EMERGENCY MEDICINE | Admitting: EMERGENCY MEDICINE

## 2023-01-08 VITALS
DIASTOLIC BLOOD PRESSURE: 77 MMHG | RESPIRATION RATE: 18 BRPM | SYSTOLIC BLOOD PRESSURE: 144 MMHG | WEIGHT: 158.95 LBS | BODY MASS INDEX: 26.48 KG/M2 | OXYGEN SATURATION: 99 % | HEIGHT: 65 IN | TEMPERATURE: 98.6 F | HEART RATE: 71 BPM

## 2023-01-08 DIAGNOSIS — L03.211 FACIAL CELLULITIS: Primary | ICD-10-CM

## 2023-01-08 LAB
ALBUMIN SERPL-MCNC: 4.1 G/DL (ref 3.5–5.2)
ALBUMIN/GLOB SERPL: 1.2 G/DL
ALP SERPL-CCNC: 90 U/L (ref 39–117)
ALT SERPL W P-5'-P-CCNC: 9 U/L (ref 1–33)
ANION GAP SERPL CALCULATED.3IONS-SCNC: 10.9 MMOL/L (ref 5–15)
AST SERPL-CCNC: 14 U/L (ref 1–32)
BASOPHILS # BLD AUTO: 0.06 10*3/MM3 (ref 0–0.2)
BASOPHILS NFR BLD AUTO: 0.4 % (ref 0–1.5)
BILIRUB SERPL-MCNC: 0.2 MG/DL (ref 0–1.2)
BUN SERPL-MCNC: 16 MG/DL (ref 6–20)
BUN/CREAT SERPL: 20.3 (ref 7–25)
CALCIUM SPEC-SCNC: 9 MG/DL (ref 8.6–10.5)
CHLORIDE SERPL-SCNC: 100 MMOL/L (ref 98–107)
CO2 SERPL-SCNC: 27.1 MMOL/L (ref 22–29)
CREAT SERPL-MCNC: 0.79 MG/DL (ref 0.57–1)
DEPRECATED RDW RBC AUTO: 39.2 FL (ref 37–54)
EGFRCR SERPLBLD CKD-EPI 2021: 103.3 ML/MIN/1.73
EOSINOPHIL # BLD AUTO: 0.13 10*3/MM3 (ref 0–0.4)
EOSINOPHIL NFR BLD AUTO: 0.8 % (ref 0.3–6.2)
ERYTHROCYTE [DISTWIDTH] IN BLOOD BY AUTOMATED COUNT: 12.2 % (ref 12.3–15.4)
GLOBULIN UR ELPH-MCNC: 3.3 GM/DL
GLUCOSE SERPL-MCNC: 107 MG/DL (ref 65–99)
HCT VFR BLD AUTO: 37.6 % (ref 34–46.6)
HGB BLD-MCNC: 12.7 G/DL (ref 12–15.9)
IMM GRANULOCYTES # BLD AUTO: 0.05 10*3/MM3 (ref 0–0.05)
IMM GRANULOCYTES NFR BLD AUTO: 0.3 % (ref 0–0.5)
LYMPHOCYTES # BLD AUTO: 2.09 10*3/MM3 (ref 0.7–3.1)
LYMPHOCYTES NFR BLD AUTO: 13 % (ref 19.6–45.3)
MCH RBC QN AUTO: 29.8 PG (ref 26.6–33)
MCHC RBC AUTO-ENTMCNC: 33.8 G/DL (ref 31.5–35.7)
MCV RBC AUTO: 88.3 FL (ref 79–97)
MONOCYTES # BLD AUTO: 0.98 10*3/MM3 (ref 0.1–0.9)
MONOCYTES NFR BLD AUTO: 6.1 % (ref 5–12)
NEUTROPHILS NFR BLD AUTO: 12.79 10*3/MM3 (ref 1.7–7)
NEUTROPHILS NFR BLD AUTO: 79.4 % (ref 42.7–76)
NRBC BLD AUTO-RTO: 0 /100 WBC (ref 0–0.2)
PLATELET # BLD AUTO: 493 10*3/MM3 (ref 140–450)
PMV BLD AUTO: 8.7 FL (ref 6–12)
POTASSIUM SERPL-SCNC: 3.3 MMOL/L (ref 3.5–5.2)
PROT SERPL-MCNC: 7.4 G/DL (ref 6–8.5)
RBC # BLD AUTO: 4.26 10*6/MM3 (ref 3.77–5.28)
SODIUM SERPL-SCNC: 138 MMOL/L (ref 136–145)
WBC NRBC COR # BLD: 16.1 10*3/MM3 (ref 3.4–10.8)

## 2023-01-08 PROCEDURE — 80053 COMPREHEN METABOLIC PANEL: CPT

## 2023-01-08 PROCEDURE — 99282 EMERGENCY DEPT VISIT SF MDM: CPT

## 2023-01-08 PROCEDURE — 70491 CT SOFT TISSUE NECK W/DYE: CPT

## 2023-01-08 PROCEDURE — 0 IOPAMIDOL PER 1 ML: Performed by: EMERGENCY MEDICINE

## 2023-01-08 PROCEDURE — 36415 COLL VENOUS BLD VENIPUNCTURE: CPT

## 2023-01-08 PROCEDURE — 85025 COMPLETE CBC W/AUTO DIFF WBC: CPT

## 2023-01-08 RX ORDER — DOXYCYCLINE 100 MG/1
100 CAPSULE ORAL 2 TIMES DAILY
Qty: 14 CAPSULE | Refills: 0 | Status: SHIPPED | OUTPATIENT
Start: 2023-01-08 | End: 2023-01-15

## 2023-01-08 RX ADMIN — IOPAMIDOL 100 ML: 755 INJECTION, SOLUTION INTRAVENOUS at 17:53

## 2023-01-08 NOTE — ED PROVIDER NOTES
Time: 3:29 PM EST  Date of encounter:  1/8/2023  Independent Historian/Clinical History and Information was obtained by:   Patient  Chief Complaint: Facial swelling    History is limited by: N/A    History of Present Illness:  Patient is a 32 y.o. year old female who presents to the emergency department for evaluation of increased facial swelling. Pt states she has had issues for a long time with her wisdom tooth being infected and just had it removed on Thursday (3 days ago). Pt was given pain medication and had been on prophylactic ABX prior to surgery since Monday, states she is almost done with course and hasn't seen any improvement. Pt states swelling was present before surgery and it seems to have worsened. Pt denies noting any drainage, fever, chills, or sore throat.    Facial Swelling  Location:  Right lower facial swelling  Severity:  Moderate  Onset quality:  Gradual  Timing:  Constant  Progression:  Worsening  Chronicity:  Recurrent  Context:  Pt has long time history of tooth infection with facial swelling, has just had it removed 2 days ago.  Relieved by:  Nothing  Worsened by:  Nothing  Ineffective treatments:  ABX, pain medication  Associated symptoms: no cough, no fever, no nausea, no shortness of breath, no sore throat and no vomiting        Patient Care Team  Primary Care Provider: Provider, No Known    Past Medical History:     No Known Allergies  Past Medical History:   Diagnosis Date   • Anxiety    • Chlamydia      History reviewed. No pertinent surgical history.  Family History   Problem Relation Age of Onset   • Heart disease Father    • Heart disease Paternal Grandmother    • Heart disease Paternal Grandfather        Home Medications:  Prior to Admission medications    Medication Sig Start Date End Date Taking? Authorizing Provider   amoxicillin-clavulanate (AUGMENTIN) 875-125 MG per tablet Take 1 tablet by mouth Every 12 (Twelve) Hours. 8/2/22   Vielka Fairchild APRN   ketorolac (TORADOL) 10 MG  "tablet Take 1 tablet by mouth Every 6 (Six) Hours As Needed for Moderate Pain . 8/2/22   FairchildVielka APRN   melatonin 5 MG tablet tablet Take 5 mg by mouth.    Provider, MD Sigifredo   MELOXICAM PO Take  by mouth.    Provider, MD Sigifredo        Social History:   Social History     Tobacco Use   • Smoking status: Every Day     Packs/day: 0.50     Types: Cigarettes   • Smokeless tobacco: Never   • Tobacco comments:     Risks of smoking were discussed.  Full cessation is encouraged.   Substance Use Topics   • Alcohol use: No   • Drug use: Yes     Types: Marijuana         Review of Systems:  Review of Systems   Constitutional: Negative for fever.   HENT: Positive for dental problem and facial swelling. Negative for sore throat.    Respiratory: Negative for cough and shortness of breath.    Gastrointestinal: Negative for nausea and vomiting.   All other systems reviewed and are negative.       Physical Exam:  /77 (BP Location: Left arm, Patient Position: Sitting)   Pulse 71   Temp 98.6 °F (37 °C) (Oral)   Resp 18   Ht 165.1 cm (65\")   Wt 72.1 kg (158 lb 15.2 oz)   LMP 01/08/2023   SpO2 99%   BMI 26.45 kg/m²     Physical Exam  Vitals and nursing note reviewed.   Constitutional:       General: She is not in acute distress.     Appearance: Normal appearance. She is not ill-appearing, toxic-appearing or diaphoretic.   HENT:      Head: Normocephalic and atraumatic.      Jaw: Swelling (Swelling of right cheek) present.      Nose: Nose normal.      Mouth/Throat:      Lips: Pink.      Mouth: Mucous membranes are moist.      Dentition: No dental tenderness, gingival swelling or dental abscesses.      Pharynx: Oropharynx is clear. Uvula midline. No oropharyngeal exudate or posterior oropharyngeal erythema.      Tonsils: No tonsillar exudate or tonsillar abscesses.      Comments: No signs of Ian's Angina.  Eyes:      Extraocular Movements: Extraocular movements intact.      Pupils: Pupils are equal, round, " and reactive to light.   Cardiovascular:      Rate and Rhythm: Normal rate and regular rhythm.      Heart sounds: Normal heart sounds.   Pulmonary:      Effort: Pulmonary effort is normal.      Breath sounds: Normal breath sounds.   Abdominal:      General: Abdomen is flat. There is no distension.   Musculoskeletal:         General: No swelling. Normal range of motion.      Cervical back: Normal range of motion and neck supple.   Skin:     General: Skin is warm and dry.      Coloration: Skin is not jaundiced.   Neurological:      General: No focal deficit present.      Mental Status: She is alert and oriented to person, place, and time.   Psychiatric:         Mood and Affect: Mood normal.         Behavior: Behavior normal.                  Procedures:  Procedures      Medical Decision Making:      Comorbidities that affect care:    Smoking    External Notes reviewed:    Previous Labs      The following orders were placed and all results were independently analyzed by me:  Orders Placed This Encounter   Procedures   • CT Soft Tissue Neck With Contrast   • CBC Auto Differential   • Comprehensive Metabolic Panel   • CBC & Differential       Medications Given in the Emergency Department:  Medications   iopamidol (ISOVUE-370) 76 % injection 100 mL (100 mL Intravenous Given 1/8/23 1753)        ED Course:    ED Course as of 01/08/23 1837   Sun Jan 08, 2023   0065 I discussed this patient with Dr. Chamberlain.  He stated to order CT of the neck at this time to determine whether this is related to abscess or potential parotid gland. [MD]   7629 Patient states she is now having drainage from the area on her cheek [MD]      ED Course User Index  [MD] Campbell Villarreal PA-C       Labs:    Lab Results (last 24 hours)     Procedure Component Value Units Date/Time    CBC & Differential [[id number]]  (Abnormal) Collected: 01/08/23 1558    Specimen: Blood Updated: 01/08/23 1609    Narrative:      The following orders were created for  panel order CBC & Differential.  Procedure                               Abnormality         Status                     ---------                               -----------         ------                     CBC Auto Differential[[id number]]        Abnormal            Final result                 Please view results for these tests on the individual orders.    CBC Auto Differential [[id number]]  (Abnormal) Collected: 01/08/23 1558    Specimen: Blood Updated: 01/08/23 1609     WBC 16.10 10*3/mm3      RBC 4.26 10*6/mm3      Hemoglobin 12.7 g/dL      Hematocrit 37.6 %      MCV 88.3 fL      MCH 29.8 pg      MCHC 33.8 g/dL      RDW 12.2 %      RDW-SD 39.2 fl      MPV 8.7 fL      Platelets 493 10*3/mm3      Neutrophil % 79.4 %      Lymphocyte % 13.0 %      Monocyte % 6.1 %      Eosinophil % 0.8 %      Basophil % 0.4 %      Immature Grans % 0.3 %      Neutrophils, Absolute 12.79 10*3/mm3      Lymphocytes, Absolute 2.09 10*3/mm3      Monocytes, Absolute 0.98 10*3/mm3      Eosinophils, Absolute 0.13 10*3/mm3      Basophils, Absolute 0.06 10*3/mm3      Immature Grans, Absolute 0.05 10*3/mm3      nRBC 0.0 /100 WBC     Comprehensive Metabolic Panel [[id number]]  (Abnormal) Collected: 01/08/23 1558    Specimen: Blood Updated: 01/08/23 1633     Glucose 107 mg/dL      BUN 16 mg/dL      Creatinine 0.79 mg/dL      Sodium 138 mmol/L      Potassium 3.3 mmol/L      Chloride 100 mmol/L      CO2 27.1 mmol/L      Calcium 9.0 mg/dL      Total Protein 7.4 g/dL      Albumin 4.1 g/dL      ALT (SGPT) 9 U/L      AST (SGOT) 14 U/L      Alkaline Phosphatase 90 U/L      Total Bilirubin 0.2 mg/dL      Globulin 3.3 gm/dL      A/G Ratio 1.2 g/dL      BUN/Creatinine Ratio 20.3     Anion Gap 10.9 mmol/L      eGFR 103.3 mL/min/1.73      Comment: National Kidney Foundation and American Society of Nephrology (ASN) Task Force recommended calculation based on the Chronic Kidney Disease Epidemiology Collaboration (CKD-EPI) equation refit without adjustment for  race.       Narrative:      GFR Normal >60  Chronic Kidney Disease <60  Kidney Failure <15             Imaging:    CT Soft Tissue Neck With Contrast    Result Date: 1/8/2023  PROCEDURE: CT SOFT TISSUE NECK W CONTRAST  COMPARISON: None  INDICATIONS: RIGHT SIDED FACIAL SWELLING AND REDNESS X 1 WEEK  PROTOCOL:   Standard imaging protocol performed    RADIATION:   DLP: 288.2mGy*cm   Automated exposure control was utilized to minimize radiation dose. CONTRAST: 75cc Isovue 370 I.V. LABS:   eGFR: >60ml/min/1.73m2  TECHNIQUE: Axial images of the neck with intravenous contrast. Coronal and sagittal reformatted images were obtained.  FINDINGS: The visualized brain is normal.  The paranasal sinuses and mastoid air cells are grossly clear.  There is right facial edema/cellulitis.  No loculated fluid collections are identified.  There is mild adenopathy in the right submandibular region and right level II region.  The parotid glands, submandibular glands and thyroid gland are normal.  The lung apices are clear.  Mild degenerative changes are present in the cervical spine.  No mucosal space masses are identified.  IMPRESSION:  Right facial cellulitis with mild adenopathy.  No loculated fluid collections are identified.  JACI GOODEN MD       Electronically Signed and Approved By: JACI GOODEN MD on 1/08/2023 at 18:07                 Differential Diagnosis and Discussion:    Facial Pain/Swelling: Differential diagnosis includes but is not limited to temporal arteritis, intracranial tumors, neuralgias, dental disease, ocular disease, TMJ syndrome, salivary gland disorders, sinusitis, cluster headaches, migraines, and psychogenic.    All labs were reviewed and analyzed by me.  CT scan radiology interpretation was reviewed by me.    MDM  Number of Diagnoses or Management Options  Facial cellulitis  Diagnosis management comments: Patient presented to the emergency department for evaluation of right facial swelling.  CBC is  noted to have white blood cell count of 16.10.  CMP is unremarkable.  CT of the soft tissue of the neck is noted to have right facial cellulitis.  I will prescribe patient doxycycline at this time.       Amount and/or Complexity of Data Reviewed  Clinical lab tests: reviewed and ordered  Tests in the radiology section of CPT®: reviewed and ordered  Discuss the patient with other providers: yes (Dr. Rueda)    Risk of Complications, Morbidity, and/or Mortality  Presenting problems: moderate  Diagnostic procedures: moderate  Management options: low    Patient Progress  Patient progress: stable    Consultants/Shared Management Plan:    None    Social Determinants of Health:    Patient is independent, reliable, and has access to care.       Disposition and Care Coordination:    Discharged: The patient is suitable and stable for discharge with no need for consideration of observation or admission.    I have explained the patient´s condition, diagnoses and treatment plan based on the information available to me at this time. I have answered questions and addressed any concerns. The patient has a good  understanding of the patient´s diagnosis, condition, and treatment plan as can be expected at this point. The vital signs have been stable. The patient´s condition is stable and appropriate for discharge from the emergency department.      The patient will pursue further outpatient evaluation with the primary care physician or other designated or consulting physician as outlined in the discharge instructions. They are agreeable to this plan of care and follow-up instructions have been explained in detail. The patient has received these instructions in written format and have expressed an understanding of the discharge instructions. The patient is aware that any significant change in condition or worsening of symptoms should prompt an immediate return to this or the closest emergency department or call to 911.  I have  explained discharge medications and the need for follow up with the patient/caretakers. This was also printed in the discharge instructions. Patient was discharged with the following medications and follow up:      Medication List      New Prescriptions    doxycycline 100 MG capsule  Commonly known as: MONODOX  Take 1 capsule by mouth 2 (Two) Times a Day for 7 days.           Where to Get Your Medications      These medications were sent to [facility]/pharm[facility] #[id number] - [place], [place] - [address] - [phone] [facility] [phone]   [address] [place] [address]    Hours: 24-hours Phone: [phone]   · doxycycline 100 MG capsule      Provider, No Known  [facility] [place] [address]    Call          Final diagnoses:   Facial cellulitis        ED Disposition     ED Disposition   Discharge    Condition   Stable    Comment   --             This medical record created using voice recognition software.      Documentation assistance provided by Maria Guadalupe Womack acting as scribe for Campbell Villarreal PA-C. Information recorded by the scribe was done at my direction and has been verified and validated by me.            Maria Guadalupe Womack  01/08/23 8902       Campbell Villarreal PA-C  01/08/23 4231

## 2023-01-08 NOTE — DISCHARGE INSTRUCTIONS
Please complete your full course of antibiotics as prescribed.  He may also benefit you to apply warm compresses 15 to 20 minutes at a time 4-5 times a day to improve the swelling.

## 2023-01-08 NOTE — Clinical Note
[facility] EMERGENCY ROOM  [address]  [place] [address]  Phone: [phone]    Baron Aguiar was seen and treated in our emergency department on 1/8/2023.  She may return to work on 01/11/2023.         Thank you for choosing [facility].    Arden Trejo, MARKO

## 2023-01-08 NOTE — Clinical Note
[facility] EMERGENCY ROOM  [address]  [place] [address]  Phone: [phone]    Baron Aguiar was seen and treated in our emergency department on 1/8/2023.  She may return to work on 01/10/2023.         Thank you for choosing [facility].    Arden Trejo, MARKO